# Patient Record
Sex: FEMALE | Race: WHITE | Employment: OTHER | URBAN - METROPOLITAN AREA
[De-identification: names, ages, dates, MRNs, and addresses within clinical notes are randomized per-mention and may not be internally consistent; named-entity substitution may affect disease eponyms.]

---

## 2021-05-10 ENCOUNTER — OFFICE VISIT - HEALTHEAST (OUTPATIENT)
Dept: VASCULAR SURGERY | Facility: CLINIC | Age: 71
End: 2021-05-10

## 2021-05-10 DIAGNOSIS — M79.89 LEG SWELLING: ICD-10-CM

## 2021-05-10 DIAGNOSIS — M79.605 LEG PAIN, BILATERAL: ICD-10-CM

## 2021-05-10 DIAGNOSIS — I87.393 VENOUS HYPERTENSION, CHRONIC, WITH COMPLICATION, BILATERAL: ICD-10-CM

## 2021-05-10 DIAGNOSIS — I89.0 ACQUIRED LYMPHEDEMA OF LEG: ICD-10-CM

## 2021-05-10 DIAGNOSIS — M79.604 LEG PAIN, BILATERAL: ICD-10-CM

## 2021-05-10 DIAGNOSIS — R26.89 IMPAIRED GAIT AND MOBILITY: ICD-10-CM

## 2021-05-10 DIAGNOSIS — M21.70 LEG LENGTH DISCREPANCY: ICD-10-CM

## 2021-05-10 DIAGNOSIS — M16.12 PRIMARY OSTEOARTHRITIS OF LEFT HIP: ICD-10-CM

## 2021-05-10 ASSESSMENT — MIFFLIN-ST. JEOR: SCORE: 1218.05

## 2021-05-27 VITALS
WEIGHT: 147.8 LBS | HEIGHT: 67 IN | HEART RATE: 84 BPM | TEMPERATURE: 97.9 F | SYSTOLIC BLOOD PRESSURE: 124 MMHG | RESPIRATION RATE: 16 BRPM | BODY MASS INDEX: 23.2 KG/M2 | DIASTOLIC BLOOD PRESSURE: 68 MMHG

## 2021-05-28 ENCOUNTER — RECORDS - HEALTHEAST (OUTPATIENT)
Dept: VASCULAR ULTRASOUND | Facility: CLINIC | Age: 71
End: 2021-05-28

## 2021-05-28 DIAGNOSIS — R26.89 OTHER ABNORMALITIES OF GAIT AND MOBILITY: ICD-10-CM

## 2021-05-28 DIAGNOSIS — I89.0 LYMPHEDEMA, NOT ELSEWHERE CLASSIFIED: ICD-10-CM

## 2021-05-28 DIAGNOSIS — M79.89 OTHER SPECIFIED SOFT TISSUE DISORDERS: ICD-10-CM

## 2021-05-28 DIAGNOSIS — M79.604 PAIN IN RIGHT LEG: ICD-10-CM

## 2021-05-28 DIAGNOSIS — M16.12 UNILATERAL PRIMARY OSTEOARTHRITIS, LEFT HIP: ICD-10-CM

## 2021-05-28 DIAGNOSIS — M21.70 UNEQUAL LIMB LENGTH (ACQUIRED), UNSPECIFIED SITE: ICD-10-CM

## 2021-05-28 DIAGNOSIS — M79.605 PAIN IN LEFT LEG: ICD-10-CM

## 2021-05-28 DIAGNOSIS — I87.393 CHRONIC VENOUS HYPERTENSION (IDIOPATHIC) WITH OTHER COMPLICATIONS OF BILATERAL LOWER EXTREMITY: ICD-10-CM

## 2021-05-29 ENCOUNTER — RECORDS - HEALTHEAST (OUTPATIENT)
Dept: ADMINISTRATIVE | Facility: CLINIC | Age: 71
End: 2021-05-29

## 2021-06-09 ENCOUNTER — COMMUNICATION - HEALTHEAST (OUTPATIENT)
Dept: VASCULAR SURGERY | Facility: CLINIC | Age: 71
End: 2021-06-09

## 2021-06-10 ENCOUNTER — COMMUNICATION - HEALTHEAST (OUTPATIENT)
Dept: VASCULAR SURGERY | Facility: CLINIC | Age: 71
End: 2021-06-10

## 2021-06-17 NOTE — PATIENT INSTRUCTIONS - HE
"    Swelling in the legs can be caused by many reasons. No matter what the reason, treatment usually includes some type of compression. You should wear your compression socks as much as you can. Your compression should be put on first thing in the morning. Take the compression off at night. It is especially important to wear them with long periods of sitting/standing, long car rides or if you will be flying. Going without compression for even brief periods of time can be damaging to your legs and your health.  Compression socks should get replaced usually every 4-6 months. They do not need to be worn at night while in bed. If we have seen you in the last year, we can refill your sock prescription, otherwise your primary care provider is able to refill them for you. Call us with any problems or questions.   Compression Velcro may need to be replaced every 9-12 months.  Often the liners and socks need to be replaced every three or four months.  The neoprene velcro may last up to 2 years.  If the velcro becomes worn a tailor may be able to repair it for you inexpensively.    If you do a lot of standing, it is good to do calf raises to help keep the blood pumping. If you sit a lot at work, it is good to get up periodically to walk around. Elevation of the foot of your bed 4-6\" helps the blood return back to where it is needed.   Please call us if you have any questions [phone]    [facility].  "

## 2021-06-25 NOTE — TELEPHONE ENCOUNTER
Relayed below result to pt; no further questions      Jenniffer Kohli MD   6/9/2021  7:38 AM CDT      Please let the patient know her venous study was fine without evidence for significant disease.

## 2021-06-30 NOTE — PROGRESS NOTES
Progress Notes by Jenniffer Kohli MD at 5/10/2021  9:15 AM     Author: Jenniffer Kohli MD Service: -- Author Type: Physician    Filed: 5/10/2021  2:23 PM Encounter Date: 5/10/2021 Status: Signed    : Jenniffer Kohli MD (Physician)             [facility] Lymphedema/Swelling Consult        Referred By: Provider, No Primary Care    Date Of Service: 05/10/2021    Chief Complaint: Bilateral leg swelling    History of Present Illness:    Radha Carolina presents to the [facility] as a new consult to evaluate bilateral leg swelling.  I saw Radha last February 8 of 2012.  At that time I was treating her for venous stasis.  She was treated with lymphedema therapy to decrease the swelling.  She was to continue exercise and compression.  She was lost to follow-up. Since I last saw her she developed left hip pain and is scheduled for L MARINA on 6/2/21 for degenerative disease with cyst. She had a bunionectomy Oct of 2019 with a lot of swelling in the right foot.  She also has developed numbness with burning sensation in the toes.  She gets some back pain with it.  The burning is better if she changes position and she does not wake up at night with pain.  There has been thickening of the skin treated with triamcinolone.   She also recently had KOH stain done which showed fungal elements per patient.  She is using Ketoconazole cream also.  She states her swelling is overall under good control.  She wears compression daily and updates it regularly.   There have been no new masses, rashes, or swellings of any other joints. There has been no new decrease in appetite, unexplained weight loss, abdominal bloating, bowel or bladder changes and irregular bleeding    History reviewed. No pertinent past medical history.    Past Surgical History:   Procedure Laterality Date   ? BUNIONECTOMY     ? TOTAL SHOULDER REPLACEMENT Bilateral          Current Outpatient  Medications:   ?  ascorbic acid, vitamin C, (VITAMIN C) 500 MG tablet, take 1 daily, Disp: , Rfl:   ?  betamethasone, augmented, (DIPROLENE-AF) 0.05 % cream, , Disp: , Rfl:   ?  buPROPion (WELLBUTRIN XL) 300 MG 24 hr tablet, , Disp: , Rfl:   ?  ketoconazole (NIZORAL) 2 % cream, , Disp: , Rfl:   ?  KRILL OIL ORAL, Take by mouth., Disp: , Rfl:   ?  PARoxetine (PAXIL) 20 MG tablet, , Disp: , Rfl:   ?  traZODone (DESYREL) 50 MG tablet, , Disp: , Rfl:   ?  triamcinolone (KENALOG) 0.1 % ointment, Bid for 2-4 weeks then bid prn  Indications: Atopic Dermatitis, Disp: , Rfl:     Allergies   Allergen Reactions   ? Sulfa (Sulfonamide Antibiotics)    ? Nickel Rash     Breaks out         Social History     Socioeconomic History   ? Marital status:      Spouse name: Not on file   ? Number of children: Not on file   ? Years of education: Not on file   ? Highest education level: Not on file   Occupational History   ? Not on file   Social Needs   ? Financial resource strain: Not on file   ? Food insecurity     Worry: Not on file     Inability: Not on file   ? Transportation needs     Medical: Not on file     Non-medical: Not on file   Tobacco Use   ? Smoking status: Former Smoker     Quit date: 2016     Years since quittin.3   ? Smokeless tobacco: Never Used   Substance and Sexual Activity   ? Alcohol use: Not on file   ? Drug use: Not on file   ? Sexual activity: Not on file   Lifestyle   ? Physical activity     Days per week: Not on file     Minutes per session: Not on file   ? Stress: Not on file   Relationships   ? Social connections     Talks on phone: Not on file     Gets together: Not on file     Attends Druze service: Not on file     Active member of club or organization: Not on file     Attends meetings of clubs or organizations: Not on file     Relationship status: Not on file   ? Intimate partner violence     Fear of current or ex partner: Not on file     Emotionally abused: Not on file     Physically  abused: Not on file     Forced sexual activity: Not on file   Other Topics Concern   ? Not on file   Social History Narrative   ? Not on file       Family History   Problem Relation Age of Onset   ? Diabetes Father        Review of Systems:  Review of systems is otherwise negative, except as noted above.  Full 12 point review of systems was completed.    Radiology/Diagnostic Studies:    I personally reviewed the following imaging results today and those on care everywhere, if indicated    EXAM: US VENOUS BILAT LOWER EXTREM DOPPLER  LOCATION: [facility]  DATE/TIME: 8/14/2020 12:35 PM    INDICATION: Leg swelling and pain  COMPARISON: None.  TECHNIQUE: Venous Duplex ultrasound of bilateral lower extremities with and without compression, augmentation and duplex. Color flow and spectral Doppler with waveform analysis performed.    FINDINGS: Exam includes the common femoral, femoral, popliteal veins as well as segmentally visualized deep calf veins and greater saphenous vein.     RIGHT: No deep vein thrombosis. No superficial thrombophlebitis. Right Baker's cyst measuring 4.8 x 4.4 x 1.7 cm.    LEFT: No deep vein thrombosis. No superficial thrombophlebitis. No popliteal cyst.    IMPRESSION:  1.  No deep venous thrombosis in the bilateral lower extremities.  2.  Right Baker's cyst.     Laboratory Studies:  I personally reviewed the following lab results today and those on care everywhere, if indicated      No results found for: SEDRATE      No results found for: CRP        No results found for: CREATININE   No results found for: HGBA1C        No results found for: BUN           No results found for: LABPROT, ALBUMIN    No results found for: [id number]    No results found for: TSH  No results found for: WBC, HGB, HCT, MCV, PLT     1/19/2021 CK total 40 C-reactive protein 0.08 1/13/2021 BUN 19 creatinine 0.74 GFR greater than 60 glucose 89 hemoglobin A1c 5.5    Physical Exam:  Vitals:    05/10/21 0930   BP: 124/68    Pulse: 84   Resp: 16   Temp: 97.9  F (36.6  C)     BMI 23.15  Weight 147     See flow chart for circumferential measures.    Vasc Edema 5/10/2021   Right just above MTP 21.5   Right Ankle 22.9   Right Widest Calf 33.7   Right Thigh Up 10cm 38.3   Left - just above MTP 20.6   Left Ankle 22.2   Left Widest Calf 33   Left Thigh Up 10cm 38.7     Side to side difference and also measures essentially stable compared to measures obtained 1/8/12    General:  71 y.o. female in no apparent distress.      Psych: Alert and oriented x 3.  Cooperative. Affect normal.    HEENT: Atraumatic, normocephalic    Abdominal: Normal bowel sounds without any pain, guarding or rigidity. No inguinal lymphadenopathy palpated.    Musculoskeletal:  Normal range of motion in knees and ankles bilaterally. Left hip slightly painful with internal rotation.  There is no active joint synovitis, erythema, swelling or joint laxity.  SLR negative bilaterally.  Significant leg length discrepancy noted with right leg being 1/4 to 1/2 inch shorter than the left.  This causes of compensatory scoliosis and when corrected she has decreased back and hip pain.     Neurological:  Sensation is intact to pin prick and light touch in both legs and intact on monofilament testing.  Strength testing is normal in hip flexion, knee flexion, knee extension, ankle dorsiflexion and great toe extension bilaterally. Deep tendon reflexes knee jerks and ankle jerks are normal bilaterally.      Vascular: Dorsalis pedis and posterior tibialis pulses are strong and equal bilaterally and reveal audible biphasic waves with a hand held doppler bilaterally. There are no significant telangietasias, medial ankle venous flares, venous varicosities  and spider veins .     Integumentary: Skin of the legs is uniformly warm and dry.  There is significant hyperkeratosis along the distal toes with crusting and thickening of the soles of the feet.  Nails are thickened, thin surrounding skin,  decreased hair growth on toes, discolored and digging into the skin      Impression:    1. Bilateral leg swelling  2. Venous hypertension chronic both legs  3. Acquired lymphedema of the legs  4. Leg pain bilaterally  5. Hyperkeratosis of the feet with underlying fungal infection  6. Leg length discrepancy right leg significantly shortened  7. Impaired gait and mobility due to above  8. Primary osteoarthritis of left hip    Plan:  1. Type of swelling was reviewed in detail with the patient.  Questions were answered and education was completed.  2. She will continue treatment with the antifungal cream supplied as this appears to be doing very well for her.  3. I like to add a urea-based cream to try to decrease some of this significant crusting in the toes and feet.  4. She is getting surgery on her hip very soon.  I want to first see how she does with that.  I would like to see her in follow-up after the surgery to see if orthotics with compensation for the leg length discrepancy are in order.  I will also see how her swelling is doing.  5. Because of the numbness and tingling she is getting I will ask for a venous insufficiency study to be done to be sure the veins are adequate and also to see if any further directed treatment would be in order down the line.  6. Patient will follow up in in about 8 weeks, or when needed.  If any questions or concerns she will contact the clinic.     Thank you very much for referring Radha LAZARO Carolina.  If you have any questions please feel free to contact me at [phone].      On day of encounter time spent in chart review and with patient in consultation, exam, education and coordination of care: 53 minutes     Jenniffer Kohli MD, Founding Diplomate ABWMS, FACCWS, FAAPMR  Medical Director Wound Care and Lymphedema  [facility]  [phone]    This note was dictated using a voice recognition software.  Any grammatical or context  distortion are unintentional and inherent to the software.

## 2021-07-04 NOTE — LETTER
Letter by Jenniffer Kohli MD at      Author: Jenniffer Kohli MD Service: -- Author Type: --    Filed:  Encounter Date: 6/10/2021 Status: (Other)           Radha WILLIS Ge  [address] [place] [address] [place] [address]     Taina 10, 2021    Dear Ms. Ge,    Below are the results from your recent visit:      Your venous study was fine without evidence for significant disease    Resulted Orders   US Venous Insufficiency Legs Bilateral    Narrative    BILATERAL Venous Insufficiency Ultrasound (05/28/21)  BILATERAL Lower Extremity      Indication:  Bilateral leg pain and swelling.    Previous: None    Patient History: Swelling and Stasis    Presenting Symptoms:  Swelling, Varicose Veins, Pain and Stasis    Technique:   Supine and Upright Ultrasound of the Deep and Superficial Veins with   Valsalva and Compression Augmentation Maneuvers. Duplex Imaging is   performed utilizing gray-scale, Two-dimensional images, color-flow   imaging, Doppler waveform analysis, and Spectral doppler imaging done with   provacative maneuvers.     Incompetency Criteria:  Deep vein reflux reported when greater than 1000 msec flow reversal.   Superficial vein reflux reported when equal to or greater than 600 msec   flow reversal.  vein reflux reported as greater than 350 msec   flow reversal.     Right  Leg Deep Veins   CFV SFJ PFV PROX FV   PROX FV MID FV DIST POP V. PERON.   V. PTV'S   Compressibility  (FC,PC,NC) FC FC FC FC FC FC FC FC FC   Reflux -  - - - - -  -       Right Leg Saphenous Veins  Location SFJ PROX THIGH KNEE MID CALF SPJ PROX CALF MID CALF   RT GSV (mm) 5 3 4 3      Reflux - - - -      RT SSV (mm)     4 3 3   Reflux     - - -     Left Leg Deep Veins   CFV SFJ PFV PROX SFV PROX SFV MID SFV DIST POP V. PERON. V. PTV'S   Compressibility  (FC,PC,NC) FC FC FC FC FC FC FC FC FC   Reflux -  - - - - -  -       Lt Leg Saphenous Veins   Location SFJ PROX THIGH KNEE MID CALF SPJ PROX CALF MID CALF   LT GSV (mm) 6 6 4 4       Reflux - - - -      LT SSV (mm)     2 2 3   Reflux     - - -     Comments: Right knee medial baker cyst seen measuring approximately 5.04 x   3.35 x 0.93 cm.     Impression:      Right Deep Vein Findings: Patent deep venous system with no evidence of   reflux    Left Deep Vein Findings: Patent deep venous system with no evidence of   reflux    Superficial Vein Findings:     Right Greater Saphenous vein: Patent Greater Saphenous Vein without   evidence of reflux.    Right Small Saphenous Vein: Patent Small Saphenous Vein without evidence   of reflux.    Left Greater Saphenous Vein: Patent Greater Saphenous Vein without   evidence of reflux.    Left Small Saphenous Vein: Patent Small Saphenous Vein without evidence of   reflux.    Perforating and Accessory Veins: No reflux noted    Reference:    Compressibility: FC= Fully compressible, PC= Partially compressible, NC=   Non-compressible, NV= Not Visualized    Reflux: (+) Incompetent  (-) Competent, (NV) = Not Visualized    Interpretation criteria:  Duration of Retrograde flow (milliseconds)  Category Deep Veins Superficial Veins  veins   Competent < 1000ms < 600ms < 350ms   Incompetent > 1000ms > 600ms > 350ms      Please call with questions or contact us using [employer].    Sincerely,    Electronically signed by Jenniffer Kohli MD

## 2021-07-19 ENCOUNTER — OFFICE VISIT (OUTPATIENT)
Dept: VASCULAR SURGERY | Facility: CLINIC | Age: 71
End: 2021-07-19
Payer: MEDICARE

## 2021-07-19 VITALS
TEMPERATURE: 97.7 F | HEART RATE: 72 BPM | WEIGHT: 145.5 LBS | RESPIRATION RATE: 16 BRPM | DIASTOLIC BLOOD PRESSURE: 70 MMHG | SYSTOLIC BLOOD PRESSURE: 110 MMHG | BODY MASS INDEX: 22.79 KG/M2

## 2021-07-19 DIAGNOSIS — R26.89 IMPAIRED GAIT AND MOBILITY: ICD-10-CM

## 2021-07-19 DIAGNOSIS — M21.70 LEG LENGTH DISCREPANCY: ICD-10-CM

## 2021-07-19 DIAGNOSIS — M79.89 LEG SWELLING: Primary | ICD-10-CM

## 2021-07-19 DIAGNOSIS — I89.0 ACQUIRED LYMPHEDEMA OF LEG: ICD-10-CM

## 2021-07-19 DIAGNOSIS — I87.393 VENOUS HYPERTENSION, CHRONIC, WITH COMPLICATION, BILATERAL: ICD-10-CM

## 2021-07-19 PROCEDURE — G0463 HOSPITAL OUTPT CLINIC VISIT: HCPCS

## 2021-07-19 PROCEDURE — 99214 OFFICE O/P EST MOD 30 MIN: CPT | Performed by: PHYSICAL MEDICINE & REHABILITATION

## 2021-07-19 NOTE — PATIENT INSTRUCTIONS
"Swelling in the legs can be caused by many reasons. No matter what the reason, treatment usually includes some type of compression. You should wear your compression socks as much as you can. Your compression should be put on first thing in the morning. Take the compression off at night. It is especially important to wear them with long periods of sitting/standing, long car rides or if you will be flying. Going without compression for even brief periods of time can be damaging to your legs and your health.  Compression socks should get replaced usually every 4-6 months. They do not need to be worn at night while in bed. If we have seen you in the last year, we can refill your sock prescription, otherwise your primary care provider is able to refill them for you. Call us with any problems or questions.   Compression Velcro may need to be replaced every 9-12 months.  Often the liners and socks need to be replaced every three or four months.  The neoprene velcro may last up to 2 years.  If the velcro becomes worn a tailor may be able to repair it for you inexpensively.    If you do a lot of standing, it is good to do calf raises to help keep the blood pumping. If you sit a lot at work, it is good to get up periodically to walk around. Elevation of the foot of your bed 4-6\" helps the blood return back to where it is needed.   Please call us if you have any questions [phone]    [facility]  "

## 2021-07-19 NOTE — PROGRESS NOTES
Went to orthotics and they decided for right now they will do over the counter. Her leg swelling is ok. Hip surgery on June 3rd. She is recovering well.

## 2021-07-19 NOTE — PROGRESS NOTES
Leg Swelling Follow Up     Date of Service: July 19, 2021     Date last seen by Dr. Kohli: May 10, 2021    PCP: Nico Walls     Impression:     1. Bilateral leg swelling-stable, doing well  2. Venous hypertension chronic both legs  3. Acquired lymphedema of the legs  4. Hyperkeratosis of the feet with underlying fungal infection-improved  5. Leg length discrepancy right leg slightly shortened-improved  6. Impaired gait and mobility due to above    Plan:  1. Questions were answered.   2. Doing well after surgery.  Slight leg length discrepancy now.  Much improved. Will wait a good 6 months post surgery for stabilization.  For now will use heel lift on right as recommended by orthotist.     3. Continue exercise as able and compression.  New compression written for, goes to [facility].  4. Patient will follow up in in about 6 months, or when needed.  If any questions or concerns she will contact the clinic.     On day of encounter time spent in chart review and with patient in consultation, exam, education and coordination of care:  38 minutes        ---------------------------------------------------------------------------------------------------------------------     Chief Complaint: Bilateral leg swelling     History of Present Illness:     Radha Carolina returns to the [facility] for bilateral leg swelling due to venous hypertension with acquired lymphedema complicated by leg length discrepancy and resultant impaired gait and mobility.  This was exacerbated by left hip osteoarthritis.   She previously had lymphedema therapy.  She was using Ketoconazole cream for concurrent fungal infection with hyperkeratosis.  At her last visit I added urea based cream.  Venous insufficiency study was ordered and was negative for significant venous compromise.  She was to continue her compression.  She had L hip replacement on 6/3/21.  She is doing very well and  reports is more active.  Her swelling has stayed under control.  She is using a right heel lift for now due to recent surgery.   She wears compression daily and updates it regularly. She is due for new compression.  There have been no new masses, rashes, or swellings of any other joints. There has been no new decrease in appetite, unexplained weight loss, abdominal bloating, bowel or bladder changes and irregular bleeding    Past Medical History:    Past Medical History:   Diagnosis Date     Arthritis      Depression      Insomnia         Surgical History:   Past Surgical History:   Procedure Laterality Date     ARTHROPLASTY SHOULDER Right 2016    Procedure: ARTHROPLASTY SHOULDER;  Surgeon: Merlin Bagley MD;  Location: [facility]     BUNIONECTOMY       left MARINA Left 2021     REVERSE ARTHROPLASTY SHOULDER Left 2016    Procedure: REVERSE ARTHROPLASTY SHOULDER;  Surgeon: Merlin Bagley MD;  Location: [facility]     TONSILLECTOMY       TOTAL SHOULDER REPLACEMENT Bilateral         Medications:    Current Outpatient Medications   Medication     acetaminophen (TYLENOL) 325 MG tablet     Ascorbic Acid (VITAMIN C PO)     BuPROPion HCl (WELLBUTRIN XL PO)     glucosamine-chondroitin 500-400 MG CAPS     multivitamin, therapeutic with minerals (THERA-VIT-M) TABS     PARoxetine HCl (PAXIL PO)     TRAZODONE HCL PO     VITAMIN D, CHOLECALCIFEROL, PO     VITAMIN E MTC PO     No current facility-administered medications for this visit.        Allergies:    Allergies   Allergen Reactions     Nickel      Breaks out     Sulfa Drugs Rash        Family history:   Family History   Problem Relation Age of Onset     Diabetes Father         Social History:   Social History     Tobacco Use     Smoking status: Former Smoker     Quit date: 2016     Years since quittin.5     Smokeless tobacco: Never Used   Substance Use Topics     Alcohol use: No     Drug use: No          Review of Systems:     ROS negative  except as noted in HPI.     Labs:      I personally reviewed the following lab results today and those on care everywhere, if indicated     No results found for: CRP   No results found for: SED   Last Renal Panel:  Sodium   Date Value Ref Range Status   08/18/2016 145 (H) 133 - 144 mmol/L Final     Potassium   Date Value Ref Range Status   08/18/2016 4.2 3.4 - 5.3 mmol/L Final     Chloride   Date Value Ref Range Status   08/18/2016 111 (H) 94 - 109 mmol/L Final     Carbon Dioxide   Date Value Ref Range Status   08/18/2016 32 20 - 32 mmol/L Final     Anion Gap   Date Value Ref Range Status   08/18/2016 2 (L) 3 - 14 mmol/L Final     Glucose   Date Value Ref Range Status   08/18/2016 91 70 - 99 mg/dL Final     Urea Nitrogen   Date Value Ref Range Status   08/18/2016 12 7 - 30 mg/dL Final     Creatinine   Date Value Ref Range Status   08/18/2016 0.78 0.52 - 1.04 mg/dL Final     GFR Estimate   Date Value Ref Range Status   08/18/2016 74 >60 mL/min/1.7m2 Final     Comment:     Non  GFR Calc     Calcium   Date Value Ref Range Status   08/18/2016 8.0 (L) 8.5 - 10.1 mg/dL Final      No results found for: WBC  No results found for: RBC  Lab Results   Component Value Date    HGB 10.9 08/19/2016     No results found for: HCT  No components found for: MCT  No results found for: MCV  No results found for: MCH  No results found for: MCHC  No results found for: RDW  No results found for: PLT   No results found for: A1C   No results found for: TSH   No results found for: VITDT     @LABRCNTIP[cult:*@      Ref Range & Units 1 mo ago   Hemoglobin 12.0 - 15.5 g/dL 9.6Low       Ref Range & Units 1 mo ago    Creatinine 0.55 - 1.02 mg/dL 0.67        GFR, Estimated >60 mL/min/1.73m2 >60        Resulting Agency  [facility]   Specimen Collected: 06/03/21 10:56 AM Last Resulted: 06/03/21 11:37 AM     Ref Range & Units 1 mo ago    Hemoglobin A1C <=5.6 % 5.7High         Resulting [facility]           Imaging:     I personally reviewed the following imaging results today and those on care everywhere, if indicated     BILATERAL Venous Insufficiency Ultrasound (05/28/21)  BILATERAL Lower Extremity     Indication:  Bilateral leg pain and swelling.     Previous: None     Patient History: Swelling and Stasis     Presenting Symptoms:  Swelling, Varicose Veins, Pain and Stasis     Technique:   Supine and Upright Ultrasound of the Deep and Superficial Veins with Valsalva and Compression Augmentation Maneuvers. Duplex Imaging is performed utilizing gray-scale, Two-dimensional images, color-flow imaging, Doppler waveform analysis, and Spectral   doppler imaging done with provacative maneuvers.      Incompetency Criteria:  Deep vein reflux reported when greater than 1000 msec flow reversal. Superficial vein reflux reported when equal to or greater than 600 msec flow reversal.  vein reflux reported as greater than 350 msec flow reversal.      Right  Leg Deep Veins   CFV SFJ PFV PROX FV   PROX FV MID FV DIST POP V. PERON.   V. PTV'S   Compressibility  (FC,PC,NC) FC FC FC FC FC FC FC FC FC   Reflux -  - - - - -  -         Right Leg Saphenous Veins  Location SFJ PROX THIGH KNEE MID CALF SPJ PROX CALF MID CALF   RT GSV (mm) 5 3 4 3      Reflux - - - -      RT SSV (mm)     4 3 3   Reflux     - - -      Left Leg Deep Veins   CFV SFJ PFV PROX SFV PROX SFV MID SFV DIST POP V. PERON. V. PTV'S   Compressibility  (FC,PC,NC) FC FC FC FC FC FC FC FC FC   Reflux -  - - - - -  -         Lt Leg Saphenous Veins   Location SFJ PROX THIGH KNEE MID CALF SPJ PROX CALF MID CALF   LT GSV (mm) 6 6 4 4      Reflux - - - -      LT SSV (mm)     2 2 3   Reflux     - - -      Comments: Right knee medial baker cyst seen measuring approximately 5.04 x 3.35 x 0.93 cm.      Impression:       Right Deep Vein Findings: Patent deep venous system with no evidence of reflux     Left Deep Vein Findings: Patent deep venous system with no  evidence of reflux     Superficial Vein Findings:      Right Greater Saphenous vein: Patent Greater Saphenous Vein without evidence of reflux.     Right Small Saphenous Vein: Patent Small Saphenous Vein without evidence of reflux.     Left Greater Saphenous Vein: Patent Greater Saphenous Vein without evidence of reflux.     Left Small Saphenous Vein: Patent Small Saphenous Vein without evidence of reflux.     Perforating and Accessory Veins: No reflux noted      Physical Exam:     Vital Signs: /70   Pulse 72   Temp 97.7  F (36.5  C) (Oral)   Resp 16   Wt 145 lb 8 oz (66 kg)   BMI 22.79 kg/m   Body mass index is 22.79 kg/m .   Wt Readings from Last 2 Encounters:   07/19/21 145 lb 8 oz (66 kg)   05/10/21 147 lb 12.8 oz (67 kg)     -2 pounds    Circumferential volume measures:        Circumferential Measures (cm)  Right just above MTP: 22.2  Right Ankle: 24.2  Right Widest Calf: 33.9  Left - just above MTP: 21.1  Left Ankle: 23.1  Left Widest Calf: 33.9     Stable side to side measures.    Ulceration(s)/Wound(s):     Peripheral IV 01/20/16 Left Hand (Active)   Number of days: 2007       Incision/Surgical Site 01/20/16 Right Shoulder (Active)   Number of days: 2007       Incision/Surgical Site 08/17/16 Left Shoulder (Active)   Number of days: 1797        General: In no apparent distress.      Psych: Alert and oriented X 3. Affect normal.     HEENT: Atraumatic, normocephalic     Musculoskeletal:  Normal range of motion in knees and ankles bilaterally without active joint synovitis, erythema, swelling or joint laxity. Leg length discrepancy noted to be decreased with less shortening on right leg.      Neurological:  Sensation is intact to pin prick and light touch in both legs.  Strength testing is normal in hip flexion, knee flexion, knee extension, ankle dorsiflexion and great toe extension bilaterally.       Vascular: Dorsalis pedis and posterior tibialis pulses are strong and equal bilaterally. There are no  significant telangietasias, medial ankle venous flares, venous varicosities  and spider veins .      Integumentary: Skin of the legs is uniformly warm and dry.  The  hyperkeratosis along the distal toes has decreased dramatically.  Nails are thickened,decreased hair growth on toes and discolored.      Jenniffer Kohli MD, Founding Diplomate ABWMS, FACCWS, FAAPMR   Medical Director Wound Care and Lymphedema   [facility]   [phone]         This note was dictated using a voice recognition software.  Any grammatical or context distortion are unintentional and inherent to the software.

## 2023-07-11 ENCOUNTER — MEDICAL CORRESPONDENCE (OUTPATIENT)
Dept: HEALTH INFORMATION MANAGEMENT | Facility: CLINIC | Age: 73
End: 2023-07-11
Payer: MEDICARE

## 2023-07-12 ENCOUNTER — ANCILLARY ORDERS (OUTPATIENT)
Dept: SURGERY | Facility: CLINIC | Age: 73
End: 2023-07-12

## 2023-07-12 ENCOUNTER — ANCILLARY ORDERS (OUTPATIENT)
Dept: GENERAL RADIOLOGY | Facility: CLINIC | Age: 73
End: 2023-07-12

## 2023-07-12 DIAGNOSIS — M79.671 FOOT PAIN, BILATERAL: ICD-10-CM

## 2023-07-12 DIAGNOSIS — M79.672 FOOT PAIN, BILATERAL: ICD-10-CM

## 2023-07-13 ENCOUNTER — ANCILLARY PROCEDURE (OUTPATIENT)
Dept: GENERAL RADIOLOGY | Facility: CLINIC | Age: 73
End: 2023-07-13
Attending: STUDENT IN AN ORGANIZED HEALTH CARE EDUCATION/TRAINING PROGRAM
Payer: MEDICARE

## 2023-07-13 DIAGNOSIS — M79.672 FOOT PAIN, BILATERAL: ICD-10-CM

## 2023-07-13 DIAGNOSIS — M79.671 FOOT PAIN, BILATERAL: ICD-10-CM
